# Patient Record
Sex: FEMALE
[De-identification: names, ages, dates, MRNs, and addresses within clinical notes are randomized per-mention and may not be internally consistent; named-entity substitution may affect disease eponyms.]

---

## 2019-06-17 ENCOUNTER — HOSPITAL ENCOUNTER (INPATIENT)
Dept: HOSPITAL 95 - BC | Age: 29
LOS: 1 days | Discharge: HOME | End: 2019-06-18
Attending: ADVANCED PRACTICE MIDWIFE | Admitting: ADVANCED PRACTICE MIDWIFE
Payer: COMMERCIAL

## 2019-06-17 VITALS — WEIGHT: 198.42 LBS | HEIGHT: 65.98 IN | BODY MASS INDEX: 31.89 KG/M2

## 2019-06-17 DIAGNOSIS — Z3A.39: ICD-10-CM

## 2019-06-17 LAB
BASOPHILS # BLD AUTO: 0.03 K/MM3
BASOPHILS NFR BLD AUTO: 0 %
DEPRECATED RDW RBC AUTO: 43.2 FL
EOSINOPHIL # BLD AUTO: 0.03 K/MM3
EOSINOPHIL NFR BLD AUTO: 0 %
ERYTHROCYTE [DISTWIDTH] IN BLOOD BY AUTOMATED COUNT: 12.6 %
HCT VFR BLD AUTO: 39.6 %
HGB BLD-MCNC: 13.6 G/DL
IMM GRANULOCYTES # BLD AUTO: 0.06 K/MM3
IMM GRANULOCYTES NFR BLD AUTO: 1 %
LYMPHOCYTES # BLD AUTO: 1.7 K/MM3
LYMPHOCYTES NFR BLD AUTO: 16 %
MCHC RBC AUTO-ENTMCNC: 34.3 G/DL
MCV RBC AUTO: 93 FL
MONOCYTES # BLD AUTO: 0.79 K/MM3
MONOCYTES NFR BLD AUTO: 7 %
NEUTROPHILS # BLD AUTO: 8.06 K/MM3
NEUTROPHILS NFR BLD AUTO: 76 %
NRBC # BLD AUTO: 0 K/MM3
NRBC BLD AUTO-RTO: 0 /100 WBC
PLATELET # BLD AUTO: 196 K/MM3

## 2019-06-17 PROCEDURE — 3E0R3BZ INTRODUCTION OF ANESTHETIC AGENT INTO SPINAL CANAL, PERCUTANEOUS APPROACH: ICD-10-PCS | Performed by: ANESTHESIOLOGY

## 2019-06-17 PROCEDURE — A9270 NON-COVERED ITEM OR SERVICE: HCPCS

## 2019-06-17 NOTE — NUR
LACTATION CONSULT, BABY LESS THAN 6 HOURS OLD AND STILL VERY SLEEPY.
INSTRUCT/DEMO SELF EBM, MILK EXPRESSES EASILY, AND INSTRUCT IN PLACING DROPS
OF COLOSTRUM ON HIS LIPS TO GIVE CALORIES AND TO HELP HIM WAKEN FOR MORE. MOM
IS EXPERIENCED AND HANDLES HIM WELL. INSTRUCT IN CHANGES TO EXPECT DURING THE
FIRST WEEK WITH FEEDINGS AND WITH BABY AND REFERRED TO BF BROCHURE AND BF
BOOKLET PAGE 18, FOR INFORMATION AND PHOTOS. QUESTIONS ANSWERED, BOTH PARENTS
ATTENTIVE TO TEACHING.

## 2019-06-18 LAB
BASOPHILS # BLD AUTO: 0.04 K/MM3
BASOPHILS NFR BLD AUTO: 1 %
DEPRECATED RDW RBC AUTO: 44.2 FL
EOSINOPHIL # BLD AUTO: 0.05 K/MM3
EOSINOPHIL NFR BLD AUTO: 1 %
ERYTHROCYTE [DISTWIDTH] IN BLOOD BY AUTOMATED COUNT: 12.9 %
HCT VFR BLD AUTO: 38.3 %
HGB BLD-MCNC: 13 G/DL
IMM GRANULOCYTES # BLD AUTO: 0.04 K/MM3
IMM GRANULOCYTES NFR BLD AUTO: 1 %
LYMPHOCYTES # BLD AUTO: 1.82 K/MM3
LYMPHOCYTES NFR BLD AUTO: 21 %
MCHC RBC AUTO-ENTMCNC: 33.9 G/DL
MCV RBC AUTO: 94 FL
MONOCYTES # BLD AUTO: 0.7 K/MM3
MONOCYTES NFR BLD AUTO: 8 %
NEUTROPHILS # BLD AUTO: 6.16 K/MM3
NEUTROPHILS NFR BLD AUTO: 70 %
NRBC # BLD AUTO: 0 K/MM3
NRBC BLD AUTO-RTO: 0 /100 WBC
PLATELET # BLD AUTO: 168 K/MM3